# Patient Record
Sex: MALE | Race: WHITE | NOT HISPANIC OR LATINO | Employment: FULL TIME | URBAN - METROPOLITAN AREA
[De-identification: names, ages, dates, MRNs, and addresses within clinical notes are randomized per-mention and may not be internally consistent; named-entity substitution may affect disease eponyms.]

---

## 2023-08-29 ENCOUNTER — OFFICE VISIT (OUTPATIENT)
Dept: FAMILY MEDICINE | Facility: CLINIC | Age: 23
End: 2023-08-29
Payer: COMMERCIAL

## 2023-08-29 VITALS
DIASTOLIC BLOOD PRESSURE: 78 MMHG | WEIGHT: 128.06 LBS | HEART RATE: 90 BPM | OXYGEN SATURATION: 100 % | SYSTOLIC BLOOD PRESSURE: 122 MMHG

## 2023-08-29 DIAGNOSIS — R11.0 NAUSEA WITHOUT VOMITING: ICD-10-CM

## 2023-08-29 DIAGNOSIS — H61.23 BILATERAL IMPACTED CERUMEN: ICD-10-CM

## 2023-08-29 DIAGNOSIS — R09.89 SYMPTOMS OF UPPER RESPIRATORY INFECTION (URI): Primary | ICD-10-CM

## 2023-08-29 DIAGNOSIS — K29.70 VIRAL GASTRITIS: ICD-10-CM

## 2023-08-29 LAB
CTP QC/QA: YES
SARS-COV-2 RDRP RESP QL NAA+PROBE: NEGATIVE

## 2023-08-29 PROCEDURE — 87635 SARS-COV-2 COVID-19 AMP PRB: CPT | Mod: QW,,, | Performed by: NURSE PRACTITIONER

## 2023-08-29 PROCEDURE — 99203 OFFICE O/P NEW LOW 30 MIN: CPT | Mod: ,,, | Performed by: NURSE PRACTITIONER

## 2023-08-29 PROCEDURE — 87635: ICD-10-PCS | Mod: QW,,, | Performed by: NURSE PRACTITIONER

## 2023-08-29 PROCEDURE — 99203 PR OFFICE/OUTPT VISIT, NEW, LEVL III, 30-44 MIN: ICD-10-PCS | Mod: ,,, | Performed by: NURSE PRACTITIONER

## 2023-08-29 RX ORDER — ONDANSETRON 4 MG/1
4 TABLET, FILM COATED ORAL EVERY 12 HOURS PRN
Qty: 12 TABLET | Refills: 0 | Status: SHIPPED | OUTPATIENT
Start: 2023-08-29

## 2023-08-29 NOTE — LETTER
August 29, 2023      [address]  [place] [address]  Phone: [phone]  Fax: [phone]       Patient: Raymon Maloney   YOB: 2000  Date of Visit: 08/29/2023    To Whom It May Concern:    Anaid Maloney  was at [facility] on 08/29/2023. Please excuse Raymon from work Friday August 25,2023 and Saturday August 26, 2023. The patient may return to work on Wednesday August 30, 2023 with no restrictions. If you have any questions or concerns, or if I can be of further assistance, please do not hesitate to contact me.    Sincerely,      Blanca Burns NP

## 2023-08-29 NOTE — PROGRESS NOTES
Subjective:       Patient ID: Raymon Maloney is a 22 y.o. male.    Chief Complaint: Sinus Problem (Patient reports that he has been having sore throat, sneezing, and nonproductive cough. Patient reports these symptoms have subsided for the most part, but has some residual sore throat. Patient reports symptoms initially began on Friday, and also reports there were other household members ill as well. Patient reports he took a home covid test on Saturday, which resulted as negative. Patient is requesting work note to return to work tomorrow, missed work on Saturday and Sunday. ) and Nausea    Raymon Maloney presents to the clinic today for a same day/ visit for sinus/URI issues    Sinus Problem:  Patient reports that he has been having sore throat, sneezing, and nonproductive cough. Patient reports these symptoms have subsided for the most part, but has some residual sore throat. Patient reports symptoms initially began on Friday, and also reports there were other household members ill as well. Patient reports he took a home covid test on Saturday, which resulted as negative. Patient is requesting work note to return to work tomorrow, missed work on Saturday and Sunday.    Nausea-   Began on Friday, denies vomiting. Had lower right and left abdominal discomfort. Continues, but is intermittent. Denies any diarrhea. Denies any changes to appetite- has continued his regular diet. Has history of issues with stomach- red sauce causes indigestion.     Taking no over the counter medications    Needing a work excuse with a release to return to work tomorrow/Thursday if possible. Works at [employer].       Review of Systems   Constitutional:  Negative for chills, fatigue and fever.   HENT:  Positive for congestion, postnasal drip, rhinorrhea, sinus pressure, sinus pain, sneezing and sore throat. Negative for ear pain.    Eyes: Negative.    Respiratory:  Positive for cough. Negative for chest tightness and  "wheezing.    Cardiovascular:  Negative for chest pain, palpitations and leg swelling.   Gastrointestinal:  Positive for abdominal pain and nausea. Negative for diarrhea and vomiting.   Endocrine: Negative.    Genitourinary: Negative.    Musculoskeletal: Negative.    Allergic/Immunologic: Negative.    Neurological:  Negative for dizziness and headaches.   Hematological: Negative.    Psychiatric/Behavioral: Negative.         There is no problem list on file for this patient.      Objective:      Physical Exam  Constitutional:       General: He is not in acute distress.     Appearance: Normal appearance.   HENT:      Right Ear: There is impacted cerumen.      Left Ear: There is impacted cerumen.      Nose: Rhinorrhea present. Rhinorrhea is clear.      Mouth/Throat:      Lips: Pink.      Pharynx: Oropharynx is clear. Uvula midline. Posterior oropharyngeal erythema present. No pharyngeal swelling or oropharyngeal exudate.      Tonsils: No tonsillar exudate or tonsillar abscesses.   Eyes:      Extraocular Movements: Extraocular movements intact.      Conjunctiva/sclera: Conjunctivae normal.   Cardiovascular:      Rate and Rhythm: Normal rate and regular rhythm.      Heart sounds: Normal heart sounds. No murmur heard.  Pulmonary:      Effort: Pulmonary effort is normal. No respiratory distress.      Breath sounds: Normal breath sounds.   Abdominal:      General: Bowel sounds are normal. There is no distension.   Lymphadenopathy:      Cervical: No cervical adenopathy.   Skin:     General: Skin is warm and dry.      Findings: No rash.   Neurological:      Mental Status: He is alert and oriented to person, place, and time.   Psychiatric:         Mood and Affect: Mood normal.         Behavior: Behavior normal.         No results found for: "WBC", "HGB", "HCT", "PLT", "CHOL", "TRIG", "HDL", "LDLDIRECT", "ALT", "AST", "NA", "K", "CL", "CREATININE", "BUN", "CO2", "TSH", "PSA", "INR", "GLUF", "HGBA1C", "MICROALBUR"  The ASCVD Risk " score (Saleem MCKEON, et al., 2019) failed to calculate for the following reasons:    The 2019 ASCVD risk score is only valid for ages 40 to 79  Visit Vitals  /78 (BP Location: Right arm, Patient Position: Sitting, BP Method: Medium (Manual))   Pulse 90   Wt 58.1 kg (128 lb 1.4 oz)   SpO2 100%      Assessment:       1. Symptoms of upper respiratory infection (URI)    2. Bilateral impacted cerumen    3. Viral gastritis    4. Nausea without vomiting        Plan:       1. Symptoms of upper respiratory infection (URI)  -     POCT COVID-19 Rapid Screening  Covid negative   Encouraged good handwashing, face covering   2. Bilateral impacted cerumen  -     carbamide peroxide (DEBROX) 6.5 % otic solution; Place 5 drops into both ears 2 (two) times daily.  Dispense: 15 mL; Refill: 0  Debrox as instructed next 3-4 days, return to clinic for bilateral ear irrigation  NO Q-TIPS   3. Viral gastritis  -     ondansetron (ZOFRAN) 4 MG tablet; Take 1 tablet (4 mg total) by mouth every 12 (twelve) hours as needed for Nausea.  Dispense: 12 tablet; Refill: 0  BRAT diet   Zofran as needed for nausea   Avoid carbonated beverages, acidic/spicy foods.   4. Nausea without vomiting  -     ondansetron (ZOFRAN) 4 MG tablet; Take 1 tablet (4 mg total) by mouth every 12 (twelve) hours as needed for Nausea.  Dispense: 12 tablet; Refill: 0       No follow-ups on file.      Future Appointments       Date Specialty Appt Notes    9/1/2023 Family Medicine nurse visit ear irrigation

## 2023-08-29 NOTE — LETTER
August 29, 2023      [address]  [place] [address]  Phone: [phone]  Fax: [phone]       Patient: Raymon Maloney   YOB: 2000  Date of Visit: 08/29/2023    To Whom It May Concern:    Anaid Maloney  was at [facility] on 08/29/2023. Please excuse Raymon from work Saturday August 26,2023 and Sunday August 27, 2023. The patient may return to work on Wednesday August 30, 2023 with no restrictions. If you have any questions or concerns, or if I can be of further assistance, please do not hesitate to contact me.    Sincerely,       Blanca Burns NP

## 2023-08-30 ENCOUNTER — TELEPHONE (OUTPATIENT)
Dept: FAMILY MEDICINE | Facility: CLINIC | Age: 23
End: 2023-08-30
Payer: COMMERCIAL

## 2023-08-30 NOTE — TELEPHONE ENCOUNTER
Spoke with Sheree Cota employer and notified her the written excuse was valid, provider had written on bottom. also tried to reach patient to inform him not to come in Monday for ear irrigation, but phone not accepting calls    ----- Message from Zioe Merchant sent at 8/30/2023  1:42 PM CDT -----  Contact: ALLA COTA, EMPLOYER  Type:  Needs Medical Advice    Who Called: ALLA COTA, EMPLOYER ([employer])  Would the patient rather a call back or a response via [facility]? CALL   Best Call Back Number:  [phone] / FAX # [phone]  Additional Information:  TYPED WORK EXCUSE STATED THAT HE CAN RETURN TO WORK TODAY 05/30/23 THEN THERE WAS A HAND WRITTEN NOTE AT THE BOTTOM  OF THE EXCUSE STATING THAT HE CAN RETURN TO WORK TOMORROW 08/31/23 SIGNED BY THE PROVIDER.    PLEASE CALL TO CONFIRM WHEN PT IS TO RET TO WORK OR IF THE RETURN LETTER IS VALID.   THANK  YOU

## 2025-04-03 ENCOUNTER — TELEPHONE (OUTPATIENT)
Dept: FAMILY MEDICINE | Facility: CLINIC | Age: 25
End: 2025-04-03
Payer: COMMERCIAL